# Patient Record
(demographics unavailable — no encounter records)

---

## 2024-12-16 NOTE — REVIEW OF SYSTEMS
[Ear Pain] : ear pain [Ear Itch] : ear itch [Hearing Loss] : hearing loss [Patient Intake Form Reviewed] : Patient intake form was reviewed [Dizziness] : no dizziness [Vertigo] : no vertigo [Recurrent Ear Infections] : no recurrent ear infections [Ear Drainage] : no ear drainage [Ear Noises] : no ear noises [Lightheadedness] : no lightheadedness [Negative] : Ear [de-identified] : left ear issues / wax build in both ears

## 2024-12-16 NOTE — PROCEDURE
[FreeTextEntry6] : Indication: Cannot adequately examine ear canal/tympanic membrane with otoscope. Findings foreign body-hearing aid dome impacted as canal removed w forceps manuel clared as

## 2024-12-16 NOTE — REASON FOR VISIT
[Initial Consultation] : an initial consultation for [Hearing Loss] : hearing loss [FreeTextEntry2] : left ear dome stuck

## 2025-04-02 NOTE — PHYSICAL EXAM
[de-identified] : right normal; left ear dome of ha in canla  [de-identified] : after removal of fb  [Midline] : trachea located in midline position [Normal] : no rashes

## 2025-04-02 NOTE — ASSESSMENT
[FreeTextEntry1] : Patient with dome of hearing aide in left ear - removed.  Ear exam normal after - followup as needed.

## 2025-04-02 NOTE — REASON FOR VISIT
[Subsequent Evaluation] : a subsequent evaluation for [Ear Pain] : ear pain [FreeTextEntry2] : hearing aid dome stuck in left ear

## 2025-04-02 NOTE — HISTORY OF PRESENT ILLNESS
[de-identified] : Has dome of hearing aide stuck in left ear.  Ear feels clogged. Noted dome of heaing aide missing last night from left ha

## 2025-04-02 NOTE — REVIEW OF SYSTEMS
[Hearing Loss] : hearing loss [Negative] : Heme/Lymph [Ear Pain] : no ear pain [Ear Itch] : no ear itch [Dizziness] : no dizziness [Vertigo] : no vertigo [Recurrent Ear Infections] : no recurrent ear infections [Ear Drainage] : no ear drainage [Ear Noises] : no ear noises [Lightheadedness] : no lightheadedness [de-identified] : left ear object stuck